# Patient Record
(demographics unavailable — no encounter records)

---

## 2018-04-19 NOTE — HP
CHIEF COMPLAINT:  Bilateral knee pain.



HISTORY OF PRESENT ILLNESS: Mr. Ocasio is a 65-year-old male with a history of 
pain in both knees that had been getting progressively worse.  He has had 
injections in the past.  Unfortunately, his pain has failed to improve with 
these injections.  Because of failure of the conservative measures, he has 
requested operative intervention.  After discussing the risks, benefits and 
alternatives to operative therapy, the patient has given informed consent for 
total knee arthroplasty.



PAST SURGICAL HISTORY:  

1.  Cardiac bypass.



MEDICATIONS:  

1.  Coreg.

2.  Lasix.

3.  Aspirin.

4.  Potassium.

5.  Ibuprofen.



ALLERGIES:   NO KNOWN DRUG ALLERGIES.



CODE STATUS:  Full code.



IMMUNIZATIONS:  Up to date.



SOCIAL HISTORY:  The patient does not smoke or use any illicit drugs.  He does 
drink on occasion.



FAMILY HISTORY:  None pertinent to today's complaint.



REVIEW OF SYSTEMS:  Negative except as indicated in the History of Present 
Illness.



PHYSICAL EXAMINATION:



VITAL SIGNS:  Blood pressure 127/71.  Pulse 56.  Height 5'6".  Weight 205 
pounds.



MENTAL STATUS:  The patient is awake, alert, and is able to give a good history 
and participate in the physical.  The patient is oriented to person, place and 
time.



SKIN:  Normal tone and turgor.



HEENT:  Normocephalic, atraumatic.  Pupils equal, round and reactive.  Mucosal 
membranes are moist.



NECK:  Normal range of motion.  No thyromegaly, no lymphadenopathy. 



CHEST:  Normal respiratory excursion.



CARDIAC:  Regular rate and rhythm.  No murmurs, rubs or gallops.



MUSCULOSKELETAL:  Bilateral upper extremities show full active range of motion.
  He has intact sensation.  They are warm and well perfused.  He has no 
deformity.  Strength is 5/5.  He has no crepitus.  The left lower extremity 
shows full range of motion of the hip without pain.  He has range of motion of 
the knee from 0 to approximately 120 degrees.  He is tender diffusely, but most 
prominent over the medial aspect and with patellar mobilization.  He has mild 
effusion today.  Strength is 5/5.  He has no varus/valgus or anterior/posterior 
laxity.  The right lower extremity shows full range of motion of the hip.  
Range of motion of the knee is from 0 to 120 degrees.  He has crepitus and pain 
throughout his range of motion.  Sensation is intact.  It is warm and well 
perfused.  He has mild effusion.  He has no varus/valgus or anterior/posterior 
laxity.  He does have a slight varus deformity.



IMAGING:  X-rays show bilateral severe arthritis.



ASSESSMENT:

1.  Arthritis.



PLAN: The plan at this point is for total knee arthroplasty.  He says the right 
is worse than the left today, so we are going start with that one.  Once he 
recovers from that, we will begin the other one.  



#[id number]
[facility]

## 2018-04-25 NOTE — OP
DATE OF PROCEDURE:  04/25/18



PREOPERATIVE DIAGNOSIS: 

1.  Osteoarthritis of the knee.



POSTOPERATIVE DIAGNOSIS: 

1.  Osteoarthritis of the knee.



PROCEDURE: 

1.  Total knee arthroplasty.



SURGEON:  Jsee Perez MD.



ASSISTANT:  Abhi Rosario CST, SA-C.



ANESTHESIA:  General.



COMPLICATIONS:  None.



FINDINGS:   Severe arthritis of the knee.



INDICATION:  Mr. Ocasio has a history of pain in the knee that has been 
refractory to conservative measures.  Because of the refractory nature and the 
severe pain that is affecting his daily activity, he has requested operative 
intervention.  After discussing the risks, benefits and alternatives to that, 
the patient has given informed consent for total knee arthroplasty.



PROCEDURE:  The patient was brought to the Operating Room and placed in supine 
position.  General anesthesia was induced and the patient's leg was sterilely 
prepped and draped.  Following prepping and draping, the distal femur was 
exposed and using an intramedullary guide, the distal femoral cut was made.  
The appropriate sized cutting block was measured, pinned into place, and the 
anterior, posterior, and chamfer cuts were made.  The ACL was transected and 
the tibia was subluxed.  Both the medial and lateral menisci were removed.  An 
intramedullary guide was used to make the proximal tibial cut.  The appropriate 
sized base plate was placed and a trial polyethylene was placed.  The trial 
femur was placed, the knee was reduced, and the knee was taken through a range 
of motion.  The knee was stable in anterior, posterior, varus and valgus 
stress.  The patella tracked anatomically without evidence of subluxation or 
dislocation.  After trialing, the trial components were removed and the bony 
surfaces were thoroughly irrigated with saline.  Following irrigation, the 
surfaces were dried and the final components were cemented into place.  The 
excess cement was removed and the remaining cement was allowed to cure.  The 
knee was again taken through a range of motion to confirm stability.  The wound 
was then irrigated with saline and closure was performed using PDS to 
approximate the arthrotomy followed by closure of the subcutaneous tissues with 
a combination of running and interrupted Monocryl sutures.  Sterile dressing 
was placed.  The patient was awoken from anesthesia and taken to Recovery.



POSTOPERATIVE INSTRUCTIONS:  The patient will be weight-bearing as tolerated on 
postoperative day 1. 



COMPONENTS:  [employer] Triathlon knee, size 4 femur, size 4 tibia, 9 mm insert.



#[id number]
[facility]

## 2018-04-25 NOTE — CONS
DATE OF CONSULTATION:  04/25/18



SUPERVISING PHYSICIAN:  Jh Avalos M.D.



CHIEF COMPLAINT:  Knee pain.



HISTORY OF PRESENT ILLNESS:  This is a 65 year-old male patient that has a 
significant history of pain in both of his knees that has progressively 
worsened.  He has had injections in the past but his pain has failed to 
improve.  Due to failed outpatient treatment, he has requested operative 
intervention by Dr. Jese Perez, orthopedic surgeon.  I am seeing the patient 
postoperatively after his right total knee arthroplasty.



PAST MEDICAL HISTORY: 

1.   Heart problems as a child.

2.   Coronary artery disease.



PAST SURGICAL HISTORY:  

1.   Cardiac bypass surgery.

2.   Cardiac surgery as a child at age 10.

3.   Vasectomy.

4.   Hernia repair.



OUTPATIENT MEDICATIONS:  Per the EMR and awaiting verification.



ALLERGIES:   HYDROCODONE.



CODE STATUS:  FULL CODE.



FAMILY HISTORY:  Noncontributory.



SOCIAL HISTORY:  He lives in [place].  He is .  He denies any tobacco, 
ETOH or illicit drug use.  He quit smoking many years ago.



REVIEW OF SYSTEMS:  Negative except as per History of Present Illness.



PHYSICAL EXAMINATION: 



VITAL SIGNS:  He is afebrile, heart rate 69, blood pressure 120/75, respiratory 
rate 15, O2 sat is 96% on 2 liters nasal cannula.



GENERAL:  This is a 65 year-old male patient who is sitting up in his hospital 
bed.  He is in no acute distress.



HEENT:  Normocephalic and atraumatic.  Pupils are equal and reactive.  
Oropharynx is clear.



NECK:  Supple without mass.



RESPIRATORY:  Clear to auscultation bilaterally.



CHEST:  There is equal rise and fall of the chest with inspiration and 
expiration.



HEART:  Regular rate and rhythm.



GASTROINTESTINAL:  Abdomen is soft, nondistended, non-tender.  Bowel sounds are 
positive.



EXTREMITIES:  No cyanosis, clubbing or edema.  Bilateral pedal pulses are 
palpable at +2.  He has a dressing to his right knee with an Ace bandage.  He 
is in the CPM machine at this time.



NEUROLOGIC:  He is awake, alert and oriented times three.



LABORATORY:  Urine is basically within normal limits.  All other labs and films 
have been reviewed via the EMR.



IMPRESSION:

1.   Osteoarthritis of bilateral knees status post right total knee arthroplasty
,

      postoperative day zero performed by Dr. Jese Perez, orthopedic surgeon.

2.   Coronary artery disease.



PLAN:  We will continue present supportive care.  Orthopedic issues will be per 
Dr. Jese Perez.  He will start with his physical therapy for strengthening and 
conditioning tomorrow with Physical Therapy.  I have restarted his home 
medications.  I have ordered a BMP for tomorrow.  Will continue to encourage 
good pulmonary hygiene.  We will continue to monitor the patient closely and 
follow as needed.  Dr. Avalos is the collaborating physician available for 
consultation.



#[id number]
[facility]

## 2018-04-26 NOTE — PN
DATE:  04/26/18



SUBJECTIVE: Mr. Ocasio is sitting up in a chair and his pain is well controlled 
right now.  



OBJECTIVE:  Afebrile.  Vital signs stable.  Dressing is clean, dry and intact.



ASSESSMENT: Status post total knee arthroplasty.



PLAN:  The plan at this point is for him to continue with weight-bearing as 
tolerated as well as increasing CPM.



#[id number]
[facility]

## 2018-04-26 NOTE — RAD
EXAM DESCRIPTION: Knee,Right 2 or More Views



CLINICAL HISTORY: 65 years, Male, TKA



COMPARISON: Previous study April 2, 2018



TECHNIQUE: Two views of the right knee



FINDINGS:



Total right knee arthroplasty is noted with air in the soft

tissues consistent with postoperative film. There is normal

anatomic alignment of femoral and tibial components with no

complicating fracture. There is vascular calcification.



IMPRESSION:



Total right knee arthroplasty. No complicating fracture.



Electronically signed by:  Jhony Mcneal MD  4/26/2018 10:45

AM CDT Workstation: [id number]

## 2018-04-26 NOTE — PN
DATE:  04/26/18



SUPERVISING PHYSICIAN:  Jh Avalos M.D.



SUBJECTIVE:  The patient is lying in his hospital bed.  He is using his CPM 
machine on his right knee.  He has no complaints of shortness of breath, chest 
pain, nausea, vomiting or diarrhea.  He feels like his physical therapy went 
well and is looking forward to going home.  We discussed at length his 
discharge plan, including his physical therapy in [place].  He will be using an 
[facility] physical therapy department and has a medical supply in [place], 
and would like to get his equipment from them.



OBJECTIVE:  VITAL SIGNS: Afebrile, heart rate 72, blood pressure 136/74, 
respiratory rate 18, O2 sat 96% on room air.  



LABORATORY:  H&H 13.2 and 38.6.  Sodium 136, potassium 3.6, chloride 95, carbon 
dioxide 33, BUN 13, creatinine 0.88, glucose 138.  Serum osmolality 74.3.  All 
other labs and films have been reviewed via the EMR.



ASSESSMENT: 

1.   Osteoarthritis of bilateral knees status post right total knee arthroplasty
, postoperative

      day #1 performed by Dr. Jese Perez, orthopedic surgeon.

2.   Coronary artery disease.



PLAN:  We will continue present supportive care.  Orthopedic issues will be per 
Dr. Perez.  He will continue his physical therapy and strengthening per Physical 
Therapy.  Will need to check on his equipment as well as his physical therapy 
appointment before discharge.  We will continue to monitor him closely and 
follow as needed.  Dr. Avalos is the collaborating physician available for 
consultation.



#[id number]
[facility]

## 2018-04-27 NOTE — PN
SUPERVISING PHYSICIAN:  Jh Avalos MD



DATE:  04/27/18



SUBJECTIVE: The patient is sitting up in his chair eating his lunch.  He feels 
like his therapy has been going well.  He does have some pain in the knee, but 
it has not been unbearable.  He denies chest pain, shortness of breath , nausea
, vomiting, diarrhea or constipation.  He feels like he should be ready to go 
home tomorrow.



OBJECTIVE:  VITAL SIGNS:  Afebrile.  Heart rate 88.  Blood pressure 136/78.  
Respiratory rate 16.  O2 saturation 94% on 2 liters nasal cannula.  

RESPIRATORY:  Essentially clear to auscultation bilaterally.  

CARDIAC: Regular rate and rhythm.  

EXTREMITIES: Dressing to his right knee is dry and intact.  He has minimal 
swelling.  Bilateral pedal pulses are +2 and strong.

NEUROLOGIC: Awake, alert and oriented times three.  



LABORATORY:  All other labs and films have been reviewed via the EMR.  



ASSESSMENT: 

1.   Osteoarthritis of bilateral knees status post right total knee arthroplasty
, postoperative

      day #2, performed by Dr. Jese Perez, orthopedic surgeon.

2.   Coronary artery disease.



PLAN:  We will continue present supportive care.  Orthopedic issues will be per 
Dr. Jese Perez, orthopedic surgeon.  Physical therapy will continue for 
strengthening and conditioning.  Physical therapy has recommended he be 
discharged tomorrow after his morning physical therapy.  He will do outpatient 
physical therapy in [place].  Continue to encourage good pulmonary hygiene.  We 
will continue to monitor the patient closely and follow as needed.  Dr. Avalos 
is the collaborating physician and available for consultation.  



#[id number]
[facility]